# Patient Record
Sex: FEMALE | Race: WHITE | URBAN - METROPOLITAN AREA
[De-identification: names, ages, dates, MRNs, and addresses within clinical notes are randomized per-mention and may not be internally consistent; named-entity substitution may affect disease eponyms.]

---

## 2018-03-20 ENCOUNTER — TELEPHONE (OUTPATIENT)
Dept: UROLOGY | Facility: CLINIC | Age: 73
End: 2018-03-20

## 2018-03-20 NOTE — TELEPHONE ENCOUNTER
Patient called and having symptoms with her kidney left side flank pain no fever but feels it could be a kidney infection  She has a stent which is what I told her could be the pain but she states has had stent for over a month and fine until last few days.  [facility] ordered and she will have to come to clinic because she has stoma. Tika Mosquera, [facility] Staff Nurse